# Patient Record
(demographics unavailable — no encounter records)

---

## 2024-10-16 NOTE — HISTORY OF PRESENT ILLNESS
[FreeTextEntry1] : f/u pt, spots on body [de-identified] : Ms. DANIEL BARAHONA is a 43 year old F here for evaluation of below.    #Spots scattered on body x years. Asymptomatic and unchanged. No alleviating/aggravating factors. Never been treated.  Derm Hx: see below - "Precancerous" spot (?mole) excised on L upper back at [facility] with Dr. London (Requested records from prior dermatologist) - s/p shave bx of lesion on mons pubis 5/10/21, pathology consistent with lentiginous melanocytic nevus with irritation, no evidence of recurrent pigmentation at the site. Personal hx of skin cancer: no FHx of skin cancer: Sister with melanoma; dad with melanoma Social Hx:  works at [employer] (formerly [employer]); pt is a teacher

## 2024-10-16 NOTE — PHYSICAL EXAM
[Alert] : alert [Oriented x 3] : ~L oriented x 3 [Well Nourished] : well nourished [Conjunctiva Non-injected] : conjunctiva non-injected [No Visual Lymphadenopathy] : no visual  lymphadenopathy [No Clubbing] : no clubbing [No Edema] : no edema [No Bromhidrosis] : no bromhidrosis [No Chromhidrosis] : no chromhidrosis [Full Body Skin Exam Performed] : performed [FreeTextEntry3] : General: Alert and oriented, in NAD.  All of the following were examined and were within normal limits, except as noted:   Scalp: Face, including eyelids, nose, lips, ears, oropharynx: Neck: Chest/Back/Abdomen: Bilateral Arms/Hands: Bilateral Legs/Feet: Buttocks, Genitalia, Anus/perineum:  	 Hair, Nails, Oral Mucosa, Eyes:    irregular brown papule L thigh - brown homogenous macules and papules on face, torso, extremities - stuckon waxy brown papules on body - dilated pore with black keratinaceous material in L labia - toenail exam limited by presence of colored nail polish

## 2024-10-16 NOTE — ASSESSMENT
[FreeTextEntry1] : #Neoplasm of uncertain behavior x1, L thigh - Rule out melanoma - Recommend tangential shave biopsy for definitive diagnosis.   - After informed consent was obtained, using Hibiclens for cleansing and 1% Lidocaine with epinephrine for anesthetic, with sterile technique a shave biopsy was used to obtain a biopsy specimen of the lesion. Hemostasis was obtained with aluminum chloride.  Vaseline was applied, and wound care instructions provided. The specimen was labeled and sent to pathology for evaluation. The procedure was well tolerated without complication.   - The patient will be contacted with biopsy results  #Seborrheic dermatitis, scalp - chronic; flaring - Refilled ketoconazole 2% shampoo EVERY OTHER DAY to affected areas on scalp.     #Multiple melanocytic nevi, benign  - Monitor moles for ABCDE - Recommend wearing hats and sun protective clothing or OTC sunscreen products (SPF 30+, broad band) daily on your face and entire body (apply sunscreen atleast 30 minutes prior to going outside). Reapply sunscreen every 2 hours when outside.  #Seborrheic Keratosis - These growths are benign - Related to genetics - these lesions run in families; NOT related to sun exposure - No treatment warranted unless inflamed; can use OTC Sarna lotion PRN itch  #Screening exam for skin cancer - TBSE performed today - Advised sun protection.  Recommended OTC sunscreen products, including SPF30+ with broadband UV protection as well as proper use.  Discussed OTC sun protective clothing - Counseled patient to monitor for changes, including ABCDEs of mole monitoring - Discussed self-skin exams